# Patient Record
Sex: FEMALE | Race: WHITE | Employment: FULL TIME | URBAN - METROPOLITAN AREA
[De-identification: names, ages, dates, MRNs, and addresses within clinical notes are randomized per-mention and may not be internally consistent; named-entity substitution may affect disease eponyms.]

---

## 2021-07-07 LAB
ABO, EXTERNAL RESULT: NORMAL
HEP B, EXTERNAL RESULT: NEGATIVE
HIV, EXTERNAL RESULT: NON REACTIVE
RH FACTOR, EXTERNAL RESULT: POSITIVE
RPR, EXTERNAL RESULT: NON REACTIVE
RUBELLA TITER, EXTERNAL RESULT: NORMAL

## 2021-07-20 LAB
C. TRACHOMATIS, EXTERNAL RESULT: NEGATIVE
N. GONORRHOEAE, EXTERNAL RESULT: NEGATIVE

## 2021-10-22 ENCOUNTER — HOSPITAL ENCOUNTER (EMERGENCY)
Age: 21
Discharge: HOME | End: 2021-10-22
Attending: EMERGENCY MEDICINE
Payer: COMMERCIAL

## 2021-10-22 VITALS
HEART RATE: 77 BPM | TEMPERATURE: 97.9 F | SYSTOLIC BLOOD PRESSURE: 107 MMHG | HEIGHT: 61 IN | BODY MASS INDEX: 23.03 KG/M2 | WEIGHT: 122 LBS | DIASTOLIC BLOOD PRESSURE: 71 MMHG | RESPIRATION RATE: 18 BRPM | OXYGEN SATURATION: 100 %

## 2021-10-22 DIAGNOSIS — S39.012A STRAIN OF LUMBAR REGION, INITIAL ENCOUNTER: Primary | ICD-10-CM

## 2021-10-22 LAB
A/G RATIO: 1.1 (ref 1.1–2.2)
ALBUMIN SERPL-MCNC: 3.5 G/DL (ref 3.4–5)
ALP BLD-CCNC: 59 U/L (ref 40–129)
ALT SERPL-CCNC: 17 U/L (ref 10–40)
AMORPHOUS: ABNORMAL /HPF
ANION GAP SERPL CALCULATED.3IONS-SCNC: 12 MMOL/L (ref 3–16)
AST SERPL-CCNC: 21 U/L (ref 15–37)
BACTERIA: ABNORMAL /HPF
BASOPHILS ABSOLUTE: 0 K/UL (ref 0–0.2)
BASOPHILS RELATIVE PERCENT: 0.2 %
BILIRUB SERPL-MCNC: 0.5 MG/DL (ref 0–1)
BILIRUBIN URINE: NEGATIVE
BLOOD, URINE: ABNORMAL
BUN BLDV-MCNC: 8 MG/DL (ref 7–20)
CALCIUM SERPL-MCNC: 8.9 MG/DL (ref 8.3–10.6)
CHLORIDE BLD-SCNC: 100 MMOL/L (ref 99–110)
CLARITY: ABNORMAL
CO2: 22 MMOL/L (ref 21–32)
COLOR: YELLOW
CREAT SERPL-MCNC: 0.7 MG/DL (ref 0.6–1.1)
EOSINOPHILS ABSOLUTE: 0 K/UL (ref 0–0.6)
EOSINOPHILS RELATIVE PERCENT: 0.2 %
EPITHELIAL CELLS, UA: ABNORMAL /HPF (ref 0–5)
GFR AFRICAN AMERICAN: >60
GFR NON-AFRICAN AMERICAN: >60
GLOBULIN: 3.2 G/DL
GLUCOSE BLD-MCNC: 115 MG/DL (ref 70–99)
GLUCOSE URINE: NEGATIVE MG/DL
HCT VFR BLD CALC: 34 % (ref 36–48)
HEMOGLOBIN: 11.5 G/DL (ref 12–16)
KETONES, URINE: 40 MG/DL
LEUKOCYTE ESTERASE, URINE: NEGATIVE
LYMPHOCYTES ABSOLUTE: 1.2 K/UL (ref 1–5.1)
LYMPHOCYTES RELATIVE PERCENT: 7.7 %
MAGNESIUM: 1.7 MG/DL (ref 1.8–2.4)
MCH RBC QN AUTO: 31 PG (ref 26–34)
MCHC RBC AUTO-ENTMCNC: 33.8 G/DL (ref 31–36)
MCV RBC AUTO: 91.6 FL (ref 80–100)
MICROSCOPIC EXAMINATION: YES
MONOCYTES ABSOLUTE: 1.2 K/UL (ref 0–1.3)
MONOCYTES RELATIVE PERCENT: 7.8 %
NEUTROPHILS ABSOLUTE: 13 K/UL (ref 1.7–7.7)
NEUTROPHILS RELATIVE PERCENT: 84.1 %
NITRITE, URINE: NEGATIVE
PDW BLD-RTO: 14.1 % (ref 12.4–15.4)
PH UA: 7 (ref 5–8)
PLATELET # BLD: 281 K/UL (ref 135–450)
PMV BLD AUTO: 7.6 FL (ref 5–10.5)
POTASSIUM REFLEX MAGNESIUM: 3.4 MMOL/L (ref 3.5–5.1)
PROTEIN UA: NEGATIVE MG/DL
RBC # BLD: 3.71 M/UL (ref 4–5.2)
RBC UA: ABNORMAL /HPF (ref 0–4)
SODIUM BLD-SCNC: 134 MMOL/L (ref 136–145)
SPECIFIC GRAVITY UA: 1.02 (ref 1–1.03)
TOTAL PROTEIN: 6.7 G/DL (ref 6.4–8.2)
URINE REFLEX TO CULTURE: ABNORMAL
URINE TYPE: ABNORMAL
UROBILINOGEN, URINE: 0.2 E.U./DL
WBC # BLD: 15.4 K/UL (ref 4–11)
WBC UA: ABNORMAL /HPF (ref 0–5)

## 2021-10-22 PROCEDURE — 80053 COMPREHEN METABOLIC PANEL: CPT

## 2021-10-22 PROCEDURE — 6370000000 HC RX 637 (ALT 250 FOR IP): Performed by: PHYSICIAN ASSISTANT

## 2021-10-22 PROCEDURE — 99284 EMERGENCY DEPT VISIT MOD MDM: CPT

## 2021-10-22 PROCEDURE — 83735 ASSAY OF MAGNESIUM: CPT

## 2021-10-22 PROCEDURE — 81001 URINALYSIS AUTO W/SCOPE: CPT

## 2021-10-22 PROCEDURE — 85025 COMPLETE CBC W/AUTO DIFF WBC: CPT

## 2021-10-22 RX ORDER — ACETAMINOPHEN 500 MG
1000 TABLET ORAL ONCE
Status: COMPLETED | OUTPATIENT
Start: 2021-10-22 | End: 2021-10-22

## 2021-10-22 RX ADMIN — ACETAMINOPHEN 1000 MG: 500 TABLET ORAL at 20:59

## 2021-10-22 ASSESSMENT — PAIN SCALES - GENERAL
PAINLEVEL_OUTOF10: 4
PAINLEVEL_OUTOF10: 5
PAINLEVEL_OUTOF10: 4

## 2021-10-22 ASSESSMENT — PAIN DESCRIPTION - LOCATION: LOCATION: BACK

## 2021-10-22 ASSESSMENT — PAIN DESCRIPTION - PAIN TYPE: TYPE: ACUTE PAIN

## 2021-10-22 NOTE — ED PROVIDER NOTES
[facility]  ED  EMERGENCY DEPARTMENT ENCOUNTER        Pt Name: Carrie Miller  MRN: [id number]  Olivier 2000  Date of evaluation: 10/22/2021  Provider: Mandy Holman PA-C  PCP: Melonie Collier  Note Started: 7:02 PM EDT       MITA. I have evaluated this patient. My supervising physician was available for consultation. Catarina Hait COMPLAINT       Chief Complaint   Patient presents with    Back Pain     Right lower back pain since yesterday morning. Pt is 23 weeks pregnant. HISTORY OF PRESENT ILLNESS   (Location, Timing/Onset, Context/Setting, Quality, Duration, Modifying Factors, Severity, Associated Signs and Symptoms)  Note limiting factors. Chief Complaint: Right low back/flank pain    Nessa Aguilar is a 22 y.o. female who presents with the above complaint. Onset yesterday. 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 Believe this to be the cause. It is worse with sitting and moving. She indicates she has had some intermittent nausea and emesis. She indicates no diarrhea or constipation. No fevers or chills. No urinary complaints. [facility] 5/10/2021. The patient is pregnant. She is G1, P0 Ab0. She does see OB. Her next OB appointment is November 1, 2021. She does have a documented ultrasound single IUP. She indicates no vaginal bleeding or discharge at this time. The patient has had no medication specifically no Tylenol with the above complaint. She indicates no abdominal pain, uterine cramping. Nursing Notes were all reviewed and agreed with or any disagreements were addressed in the HPI. REVIEW OF SYSTEMS    (2-9 systems for level 4, 10 or more for level 5)     Review of Systems    Positives and Pertinent negatives as per HPI. Except as noted above in the ROS, all other systems were reviewed and negative. PAST MEDICAL HISTORY   History reviewed. No pertinent past medical history.       SURGICAL HISTORY     Past Surgical History:   Procedure Laterality Date    TONSILLECTOMY      TYMPANOSTOMY TUBE PLACEMENT           CURRENTMEDICATIONS       Previous Medications    NAPROXEN (NAPROSYN) 375 MG TABLET    Take 1 tablet by mouth 2 times daily for 7 days         ALLERGIES     Patient has no known allergies. FAMILYHISTORY     History reviewed. No pertinent family history. SOCIAL HISTORY       Social History     Tobacco Use    Smoking status: Passive Smoke Exposure - Never Smoker    Smokeless tobacco: Never Used   Substance Use Topics    Alcohol use: No    Drug use: No       SCREENINGS             PHYSICAL EXAM    (up to 7 for level 4, 8 or more for level 5)     ED Triage Vitals [10/22/21 1842]   BP Temp Temp Source Pulse Resp SpO2 Height Weight   104/75 97.9 °F (36.6 °C) Oral 99 18 100 % 5' 1\" (1.549 m) 122 lb (55.3 kg)       Physical Exam  Vitals and nursing note reviewed. Constitutional:       Appearance: Normal appearance. She is well-developed and normal weight. HENT:      Head: Normocephalic and atraumatic. Right Ear: External ear normal.      Left Ear: External ear normal.   Eyes:      General: No scleral icterus. Right eye: No discharge. Left eye: No discharge. Conjunctiva/sclera: Conjunctivae normal.   Cardiovascular:      Rate and Rhythm: Normal rate and regular rhythm. Heart sounds: Normal heart sounds. Pulmonary:      Effort: Pulmonary effort is normal.      Breath sounds: Normal breath sounds. Abdominal:      General: Abdomen is flat. Bowel sounds are normal.      Palpations: Abdomen is soft. Tenderness: There is no abdominal tenderness. Comments: Gravid uterus noted. Not tender. Musculoskeletal:         General: Tenderness present. Normal range of motion. Cervical back: Normal range of motion and neck supple. Comments: With pressure applied over the paralumbar structures on the right there is discomfort.    Skin:     General: Skin is warm and dry.   Neurological:      General: No focal deficit present. Mental Status: She is alert and oriented to person, place, and time. Mental status is at baseline. Psychiatric:         Mood and Affect: Mood normal.         Behavior: Behavior normal.         Thought Content:  Thought content normal.         Judgment: Judgment normal.         DIAGNOSTIC RESULTS   LABS:    Labs Reviewed   URINE RT REFLEX TO CULTURE - Abnormal; Notable for the following components:       Result Value    Clarity, UA CLOUDY (*)     Ketones, Urine 40 (*)     Blood, Urine TRACE-INTACT (*)     All other components within normal limits    Narrative:     Performed at:  [place] [address] [address]   Phone [phone]   CBC WITH AUTO DIFFERENTIAL - Abnormal; Notable for the following components:    WBC 15.4 (*)     RBC 3.71 (*)     Hemoglobin 11.5 (*)     Hematocrit 34.0 (*)     Neutrophils Absolute 13.0 (*)     All other components within normal limits    Narrative:     Performed at:  [place] [address] [address]   Phone [phone]   COMPREHENSIVE METABOLIC PANEL W/ REFLEX TO MG FOR LOW K - Abnormal; Notable for the following components:    Sodium 134 (*)     Potassium reflex Magnesium 3.4 (*)     Glucose 115 (*)     All other components within normal limits    Narrative:     Performed at:  [place] [address] [address]   Phone [phone]   MICROSCOPIC URINALYSIS - Abnormal; Notable for the following components:    Bacteria, UA 1+ (*)     All other components within normal limits    Narrative:     Performed at:  [place] [address] [address]   Phone [phone]   MAGNESIUM - Abnormal; Notable for the following components:    Magnesium 1.70 (*)     All other components within normal limits    Narrative: Performed at:  [facility]) [facility]  [address] [place] [address],  [place], [address] [place], [place] [address]s [place]   Phone [phone]       When ordered only abnormal lab results are displayed. All other labs were within normal range or not returned as of this dictation. EKG: When ordered, EKG's are interpreted by the Emergency Department Physician in the absence of a cardiologist.  Please see their note for interpretation of EKG. RADIOLOGY:   Non-plain film images such as CT, Ultrasound and MRI are read by the radiologist. Plain radiographic images are visualized and preliminarily interpreted by the ED Provider with the below findings:        Interpretation per the Radiologist below, if available at the time of this note:    No orders to display     No results found. PROCEDURES   Unless otherwise noted below, none     Procedures    CRITICAL CARE TIME   N/A    CONSULTS:  None      EMERGENCY DEPARTMENT COURSE and DIFFERENTIAL DIAGNOSIS/MDM:   Vitals:    Vitals:    10/22/21 1842 10/22/21 2100   BP: 104/75 107/71   Pulse: 99 77   Resp: 18 18   Temp: 97.9 °F (36.6 °C)    TempSrc: Oral    SpO2: 100% 100%   Weight: 122 lb (55.3 kg)    Height: 5' 1\" (1.549 m)        Patient was given the following medications:  Medications   acetaminophen (TYLENOL) tablet 1,000 mg (1,000 mg Oral Given 10/22/21 2059)           Patient presenting with a right lumbar strain. She has tenderness over the lumbar musculature. She has no uterine pain nor any vaginal bleeding or discharge. OB evaluation she is cleared. Patient is mechanical strain as she works in a  lifting children. UA shows no sign of UTI. WBC 15.4 and likely related to the pregnancy at 23 weeks. CMP shows normal renal and hepatic function. Magnesium slightly low 1.7. No replacement at this time. Potassium 3.4 no replacement at this time. Tylenol 1 g p.o. given. Recommend continuation Tylenol 1000 mg 3 times daily as as needed.   She does not use aspirin or

## 2021-10-22 NOTE — PROGRESS NOTES
23.4 wks with complaints of lower back pain (4/10) that she suspects may be from lifting at work. +FM and denies VB. Pt unsure who she sees for OB care. NST began at this time.

## 2021-10-23 NOTE — PROGRESS NOTES
Brief OB MD Note    Called by RN to evaluate NST for patient in ER with back pain at 23w4d    NST: 150 bpm, moderate variability, + accels, - decels  [place]: quiescent    Reassuring FHT  MD evaluation reported to Dr. Kanu Mccain via RN    Casey Bhandari MD

## 2021-10-23 NOTE — PROGRESS NOTES
(house doctor) reviewed NST.  (Pt's OB) updated on pt status and SBAR. No further orders from OB standpoint.

## 2021-10-26 NOTE — ED PROVIDER NOTES
Emergency Department Attending Provider Note  Location: [address] [place] [address]  ED  10/22/2021     Patient Identification  Nessa Albright is a 22 y.o. female      Iram Mclean was evaluated in the Emergency Department for back pain. Consistent with musculoskeletal pain. Labs were obtained which show a leukocytosis with no clinical evidence of infection. Likely secondary to current pregnancy. Plan for discharge with appropriate follow-up and return precautions. .  Initial history and physical exam information was obtained by MITA/NPP (who also dictated a record of this visit). I was available for consultation and discussion of diagnostic, treatment, and disposition decisions. However, I did not independently examine and evaluate this patient. This chart was generated in part by using Dragon Dictation system and may contain errors related to that system including errors in grammar, punctuation, and spelling, as well as words and phrases that may be inappropriate. If there are any questions or concerns please feel free to contact the dictating provider for clarification.      MD Quinn Vasquez MD  10/26/21 7104

## 2022-01-20 LAB — GBS, EXTERNAL RESULT: NEGATIVE

## 2022-02-14 ENCOUNTER — HOSPITAL ENCOUNTER (OUTPATIENT)
Age: 22
Discharge: HOME | End: 2022-02-14
Payer: COMMERCIAL

## 2022-02-14 ENCOUNTER — PREP FOR PROCEDURE (OUTPATIENT)
Dept: OBGYN | Age: 22
End: 2022-02-14

## 2022-02-14 DIAGNOSIS — Z34.93 NORMAL PREGNANCY, THIRD TRIMESTER: ICD-10-CM

## 2022-02-14 PROCEDURE — U0003 INFECTIOUS AGENT DETECTION BY NUCLEIC ACID (DNA OR RNA); SEVERE ACUTE RESPIRATORY SYNDROME CORONAVIRUS 2 (SARS-COV-2) (CORONAVIRUS DISEASE [COVID-19]), AMPLIFIED PROBE TECHNIQUE, MAKING USE OF HIGH THROUGHPUT TECHNOLOGIES AS DESCRIBED BY CMS-2020-01-R: HCPCS

## 2022-02-14 PROCEDURE — U0005 INFEC AGEN DETEC AMPLI PROBE: HCPCS

## 2022-02-14 RX ORDER — SODIUM CHLORIDE 0.9 % (FLUSH) 0.9 %
5-40 SYRINGE (ML) INJECTION PRN
Status: CANCELLED | OUTPATIENT
Start: 2022-02-14

## 2022-02-14 RX ORDER — NALBUPHINE HCL 10 MG/ML
10 AMPUL (ML) INJECTION
Status: CANCELLED | OUTPATIENT
Start: 2022-02-14

## 2022-02-14 RX ORDER — ONDANSETRON 2 MG/ML
4 INJECTION INTRAMUSCULAR; INTRAVENOUS EVERY 6 HOURS PRN
Status: CANCELLED | OUTPATIENT
Start: 2022-02-14

## 2022-02-14 RX ORDER — DOCUSATE SODIUM 100 MG/1
100 CAPSULE, LIQUID FILLED ORAL 2 TIMES DAILY
Status: CANCELLED | OUTPATIENT
Start: 2022-02-14

## 2022-02-14 RX ORDER — SODIUM CHLORIDE, SODIUM LACTATE, POTASSIUM CHLORIDE, CALCIUM CHLORIDE 600; 310; 30; 20 MG/100ML; MG/100ML; MG/100ML; MG/100ML
INJECTION, SOLUTION INTRAVENOUS CONTINUOUS
Status: CANCELLED | OUTPATIENT
Start: 2022-02-14

## 2022-02-14 RX ORDER — SODIUM CHLORIDE 9 MG/ML
25 INJECTION, SOLUTION INTRAVENOUS PRN
Status: CANCELLED | OUTPATIENT
Start: 2022-02-14

## 2022-02-14 RX ORDER — BUTORPHANOL TARTRATE 1 MG/ML
1 INJECTION, SOLUTION INTRAMUSCULAR; INTRAVENOUS
Status: CANCELLED | OUTPATIENT
Start: 2022-02-14

## 2022-02-14 RX ORDER — SODIUM CHLORIDE, SODIUM LACTATE, POTASSIUM CHLORIDE, AND CALCIUM CHLORIDE .6; .31; .03; .02 G/100ML; G/100ML; G/100ML; G/100ML
1000 INJECTION, SOLUTION INTRAVENOUS PRN
Status: CANCELLED | OUTPATIENT
Start: 2022-02-14

## 2022-02-14 RX ORDER — SODIUM CHLORIDE, SODIUM LACTATE, POTASSIUM CHLORIDE, AND CALCIUM CHLORIDE .6; .31; .03; .02 G/100ML; G/100ML; G/100ML; G/100ML
500 INJECTION, SOLUTION INTRAVENOUS PRN
Status: CANCELLED | OUTPATIENT
Start: 2022-02-14

## 2022-02-14 RX ORDER — SODIUM CHLORIDE 0.9 % (FLUSH) 0.9 %
5-40 SYRINGE (ML) INJECTION EVERY 12 HOURS SCHEDULED
Status: CANCELLED | OUTPATIENT
Start: 2022-02-14

## 2022-02-14 NOTE — PROGRESS NOTES
>      PATIENT:  Cordelia Vasquez  YOB: 2000  AGE:  22 years  DATE:   02/14/2022 11:45 AM   VISIT TYPE: OB Prenatal      First Visit Detail: Visit date: 06/23/2021  First Trimester  First visit here     GESTATIONAL AGE: 40w0d    JOANN CONFIRMATION  INITIAL JOANN: DATE                         EGA                                     JOANN   LMP 05/10/2021 40w0d     02/14/2022   ULTRASOUND 07/12/2021 9w4d     02/10/2022       WORKING JOANN: 02/14/2022  last modified on 02/14/2022 by Onesimo Muir. MULTIPLE GESTATION:  [place]  MENSTRUAL HISTORY:                  LMP:  definite                    Menses monthly:  yes                    Menarche:  14 y.o. (onset)                   On BCP at concept:  yes                    Other contraception:  none                         History of Present Illness:  1.  routine prenatal         Weight Status  Pre-pregnancy weight (lb): 113.60 Date: 06/23/2021. Screening Tools  Other Screenings:  Encounter Date Performed Date Instrument Score Severity/Interpretation MDD Classification   02/14/2022 02/14/2022 CAGE-AID Alcohol and Drug Screening 0 None    02/14/2022 02/14/2022 Patient Health Questionnaire (PHQ-2) 0 Further testing is not required    02/14/2022 02/14/2022 SDOH 0 Intervention not needed      CAGE ALCOHOL SCREENING TEST      Felt need to cut down drinking? No       Ever felt annoyed by criticism of drinking? No       Had guilty feelings about drinking? No       Ever take morning eye-opener?  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 N       MEDICAL HISTORY        PPD/Amt / Day PPD/Amt/ Day # Years Use    Pre-preg Preg      Tobacco   0.00     Alcohol        Illicit/recreational drugs             GENERAL      Patient Agrees to Transfusion: Yes      Antepartum Anesthesia Consult Planned: Yes      Patient Desires Sterilization: No Planned Feeding: Breast      Enrolled in [facility] Prenatal Care Program: No      Prenatal Classes Taken: Yes      Seat Belt Use: Yes      Cats in Home: Yes    Medications (active prior to today)  Medication Name Sig Description Start Date Stop Date Refilled Rx Elsewhere   PRENATAL VITAMINS  //   Y     Medication Reconciliation  Medications reconciled today. Medication Reviewed  Adherence Medication Name Sig Desc Elsewhere Status   taking as directed PRENATAL VITAMINS  Y Verified   ALLERGIES  Description Reaction (Severity)   NO KNOWN ALLERGIES    Reviewed, no changes. Gynecologic History:  Patient is premenopausal. Last menses was 05/10/2021. Menarche:  14 y.o. (onset)  Patient has not had a hysterectomy. OBSTETRIC HISTORY    Currently pregnant. :1.      Parity: Term:0.  Pre-Term: 0. : 0. Livin. Past Systemic History    Medical History (Detailed)    Surgical History/Management (Detailed)  Diagnostics History:  Status Study Ordered Completed Interpretation  Result / Report   completed OB Anatomy >/=14 WKS, SINGLE FETUS 2021      completed ULTRASOUND/GROWTH <14 WKS 10/12/2021 2021      completed ULTRASOUND/PREG BRIEF 2021          Pap Result, HPV Detail and Diagnostic/Treatment Performed        Family History  (Detailed)  Relationship Family Member Name  Age at Death Condition Onset Age Cause of Death   Maternal grandfather    Cancer, lung  Y   Paternal grandfather    Cancer, lung  Y   Paternal uncle    Depression  N   Paternal uncle    Alcoholism  N   SOCIAL HISTORY:  (Detailed)  GENERAL INFORMATION   Preferred language is English. Language spoken at home is [place]. Born in [facility]. RACE  White  Patient's ethnicity is Non  or . Occupational hazards include none. The patient has a(n) high school education.     Employment History Status Retired Restrictions          TOBACCO  Smoking status: Never smoker. SMOKING STATUS  Type Smoking Status Usage Per Day Years Used Pack Years Total Pack Years    Never smoker             VAPING USE  Screened for vaping? Yes  Status: Not a current user    ALCOHOL  There is no history of alcohol use. CAFFEINE  The patient uses caffeine: soda - 24 oz a day  LIFESTYLE  Pets include cats. HOME ENVIROMENT/SAFETY  Smoke detector(s) at home. No firearms at home. Uses seat belts. ADDITIONAL SOCIAL HISTORY   EDUCATION/EMPLOYMENT/OCCUPATION/ EXPERIENCE  Attends [employer] . Grade level in school is eleventh grade. Patient has repeated grade(s). MARITAL STATUS/FAMILY/SOCIAL SUPPORT  Marital status: Single   ADDITIONAL SOCIAL HISTORY   EDUCATION/EMPLOYMENT/OCCUPATION/ EXPERIENCE  Attends [employer] . Grade level in school is eleventh grade. Patient has repeated grade(s). MARITAL STATUS/FAMILY/SOCIAL SUPPORT  Marital status: Single   PRENATAL FLOWSHEET  General  Height(in.): 61.0 inches  Pre-pregnancy weight:   113.60. Date: 06/23/2021. Last weight: 138. 20. Date: 02/14/2022. Total weight gain:  24.60. [de-identified] father: Kirti Shankar  Support person: Rae April (mom)  Plan is to breast feed. Patient is not enrolled in [facility] prenatal care program.    Patient agrees to transfusion. Antepartum anesthesia consult planned. Patient will attend prenatal classes. Sterilization is not desired. Cats in home    PROBLEM DETAIL  Priority Problem Detail    H/O HSV-1 vs Canker sore? 11/2020 Pt denied H/O of HSV. . On 11/10/2020 pt. saw PCP for a canker sore and was dx with HSV, but unable to find any cultures or blood work to confirm, pt states she has never had HSV, 7/7/21 MLS. conceived on OC's Patient missed pills in the pack when conception occurred 7/7/2021 MLS.     Mild bilateral pyelectasis - resoloved 11/1/21 11/1 @25.5: 794g/53%, Fr BREECH, R 3.9/L 3.1 normal, mvp nl. JL  9/27 at 20.0w, S=D, mild shari pyelectasis R 3.9mm/L 4.7mm, overall Result details: Color: light yellow. Clarity: clear. Glucose: negative. Bilirubin: negative. Ketones: negative. Specific Gravity: 1.010. Blood: trace. pH: 6.0. Protein: negative. Urobilinogen: normal.  Nitrite: negative. Leukocytes: negative. MEDICATIONS (added, continued, or stopped today)  Start Date Medication Directions PRN Status PRN Reason Instruction Stop Date    PRENATAL VITAMINS  N        To Be Scheduled / Ordered:  Status Order Reason Assessment Timeframe Appointment   ordered FETAL NONSTRESS TEST/IN OFFICE  O09.93     ORDERS/PROCEDURES/INSTRUCTIONS/EDUCATION  OFFICE LABS:  Order     FETAL NONSTRESS TEST/IN OFFICE     Urine Dip Only see detail Color: light yellow. Clarity: clear. Glucose: negative. Bilirubin: negative. Ketones: negative. Specific Gravity: 1.010. Blood: trace. pH: 6.0. Protein: negative. Urobilinogen: normal.  Nitrite: negative. Leukocytes: negative. The patient was checked out at 12:23 PM by Gene Calloway. Patient Comments    Active Patient Care Team Members    Name Contact Agency Type Support Role Relationship Active Date Inactive Date Specialty   Orson Mcburney    Parent, Child Is The PT      Kade Xavier DO   Patient provider PCP   Family Pract       Provider:   Jordan Armando  02/14/2022 6:49 PM   Document generated by:  Hanna Haynes 02/14/2022 06:49 PM    [facility]  [address] [place] [address]   Phone: [phone]  Fax: [phone]          Electronically signed by Hanna Haynes DO on 02/14/2022 06:49 PM

## 2022-02-14 NOTE — H&P
Katelynn Ro is a 22 y.o. female patient. No diagnosis found. No past medical history on file. OB History        1    Para        Term                AB        Living           SAB        IAB        Ectopic        Molar        Multiple        Live Births                  40w0d  Estimated Date of Delivery: 22  No Known Allergies  Active Problems:    * No active hospital problems. *  Resolved Problems:    * No resolved hospital problems. *    There were no vitals taken for this visit.     History  Exam  Assessment & DO Jovana  2022

## 2022-02-15 ENCOUNTER — APPOINTMENT (OUTPATIENT)
Dept: LABOR AND DELIVERY | Age: 22
End: 2022-02-15
Payer: COMMERCIAL

## 2022-02-15 ENCOUNTER — ANESTHESIA (OUTPATIENT)
Dept: LABOR AND DELIVERY | Age: 22
End: 2022-02-15
Payer: COMMERCIAL

## 2022-02-15 ENCOUNTER — ANESTHESIA EVENT (OUTPATIENT)
Dept: LABOR AND DELIVERY | Age: 22
End: 2022-02-15
Payer: COMMERCIAL

## 2022-02-15 ENCOUNTER — HOSPITAL ENCOUNTER (INPATIENT)
Age: 22
LOS: 3 days | Discharge: HOME | End: 2022-02-18
Attending: OBSTETRICS & GYNECOLOGY | Admitting: OBSTETRICS & GYNECOLOGY
Payer: COMMERCIAL

## 2022-02-15 LAB
ABO/RH: NORMAL
AMPHETAMINE SCREEN, URINE: NORMAL
ANTIBODY SCREEN: NORMAL
BARBITURATE SCREEN URINE: NORMAL
BASOPHILS ABSOLUTE: 0 K/UL (ref 0–0.2)
BASOPHILS RELATIVE PERCENT: 0.4 %
BENZODIAZEPINE SCREEN, URINE: NORMAL
BUPRENORPHINE URINE: NORMAL
CANNABINOID SCREEN URINE: NORMAL
COCAINE METABOLITE SCREEN URINE: NORMAL
EOSINOPHILS ABSOLUTE: 0 K/UL (ref 0–0.6)
EOSINOPHILS RELATIVE PERCENT: 0.3 %
HCT VFR BLD CALC: 37.3 % (ref 36–48)
HEMOGLOBIN: 12.4 G/DL (ref 12–16)
LYMPHOCYTES ABSOLUTE: 1.6 K/UL (ref 1–5.1)
LYMPHOCYTES RELATIVE PERCENT: 15.1 %
Lab: NORMAL
MCH RBC QN AUTO: 28.5 PG (ref 26–34)
MCHC RBC AUTO-ENTMCNC: 33.2 G/DL (ref 31–36)
MCV RBC AUTO: 85.7 FL (ref 80–100)
METHADONE SCREEN, URINE: NORMAL
MONOCYTES ABSOLUTE: 0.7 K/UL (ref 0–1.3)
MONOCYTES RELATIVE PERCENT: 6.8 %
NEUTROPHILS ABSOLUTE: 8.3 K/UL (ref 1.7–7.7)
NEUTROPHILS RELATIVE PERCENT: 77.4 %
OPIATE SCREEN URINE: NORMAL
OXYCODONE URINE: NORMAL
PDW BLD-RTO: 13.9 % (ref 12.4–15.4)
PH UA: 6
PHENCYCLIDINE SCREEN URINE: NORMAL
PLATELET # BLD: 235 K/UL (ref 135–450)
PMV BLD AUTO: 9.1 FL (ref 5–10.5)
PROPOXYPHENE SCREEN: NORMAL
RBC # BLD: 4.36 M/UL (ref 4–5.2)
SARS-COV-2: NOT DETECTED
WBC # BLD: 10.8 K/UL (ref 4–11)

## 2022-02-15 PROCEDURE — 51701 INSERT BLADDER CATHETER: CPT

## 2022-02-15 PROCEDURE — 86850 RBC ANTIBODY SCREEN: CPT

## 2022-02-15 PROCEDURE — 1220000000 HC SEMI PRIVATE OB R&B

## 2022-02-15 PROCEDURE — 2500000003 HC RX 250 WO HCPCS: Performed by: NURSE ANESTHETIST, CERTIFIED REGISTERED

## 2022-02-15 PROCEDURE — 2580000003 HC RX 258: Performed by: OBSTETRICS & GYNECOLOGY

## 2022-02-15 PROCEDURE — 86900 BLOOD TYPING SEROLOGIC ABO: CPT

## 2022-02-15 PROCEDURE — 86901 BLOOD TYPING SEROLOGIC RH(D): CPT

## 2022-02-15 PROCEDURE — 80307 DRUG TEST PRSMV CHEM ANLYZR: CPT

## 2022-02-15 PROCEDURE — 85025 COMPLETE CBC W/AUTO DIFF WBC: CPT

## 2022-02-15 PROCEDURE — 86592 SYPHILIS TEST NON-TREP QUAL: CPT

## 2022-02-15 PROCEDURE — 3700000025 EPIDURAL BLOCK: Performed by: ANESTHESIOLOGY

## 2022-02-15 RX ORDER — SODIUM CHLORIDE 0.9 % (FLUSH) 0.9 %
5-40 SYRINGE (ML) INJECTION EVERY 12 HOURS SCHEDULED
Status: DISCONTINUED | OUTPATIENT
Start: 2022-02-15 | End: 2022-02-16

## 2022-02-15 RX ORDER — BUTORPHANOL TARTRATE 1 MG/ML
1 INJECTION, SOLUTION INTRAMUSCULAR; INTRAVENOUS
Status: DISCONTINUED | OUTPATIENT
Start: 2022-02-15 | End: 2022-02-16

## 2022-02-15 RX ORDER — BUPIVACAINE HYDROCHLORIDE 2.5 MG/ML
INJECTION, SOLUTION EPIDURAL; INFILTRATION; INTRACAUDAL PRN
Status: DISCONTINUED | OUTPATIENT
Start: 2022-02-15 | End: 2022-02-16 | Stop reason: SDUPTHER

## 2022-02-15 RX ORDER — SODIUM CHLORIDE, SODIUM LACTATE, POTASSIUM CHLORIDE, AND CALCIUM CHLORIDE .6; .31; .03; .02 G/100ML; G/100ML; G/100ML; G/100ML
1000 INJECTION, SOLUTION INTRAVENOUS PRN
Status: DISCONTINUED | OUTPATIENT
Start: 2022-02-15 | End: 2022-02-16

## 2022-02-15 RX ORDER — SODIUM CHLORIDE, SODIUM LACTATE, POTASSIUM CHLORIDE, CALCIUM CHLORIDE 600; 310; 30; 20 MG/100ML; MG/100ML; MG/100ML; MG/100ML
INJECTION, SOLUTION INTRAVENOUS CONTINUOUS
Status: DISCONTINUED | OUTPATIENT
Start: 2022-02-15 | End: 2022-02-16

## 2022-02-15 RX ORDER — DOCUSATE SODIUM 100 MG/1
100 CAPSULE, LIQUID FILLED ORAL 2 TIMES DAILY
Status: DISCONTINUED | OUTPATIENT
Start: 2022-02-15 | End: 2022-02-16

## 2022-02-15 RX ORDER — SODIUM CHLORIDE 9 MG/ML
25 INJECTION, SOLUTION INTRAVENOUS PRN
Status: DISCONTINUED | OUTPATIENT
Start: 2022-02-15 | End: 2022-02-16

## 2022-02-15 RX ORDER — NALBUPHINE HCL 10 MG/ML
10 AMPUL (ML) INJECTION
Status: DISCONTINUED | OUTPATIENT
Start: 2022-02-15 | End: 2022-02-16

## 2022-02-15 RX ORDER — SODIUM CHLORIDE, SODIUM LACTATE, POTASSIUM CHLORIDE, AND CALCIUM CHLORIDE .6; .31; .03; .02 G/100ML; G/100ML; G/100ML; G/100ML
500 INJECTION, SOLUTION INTRAVENOUS PRN
Status: DISCONTINUED | OUTPATIENT
Start: 2022-02-15 | End: 2022-02-16

## 2022-02-15 RX ORDER — ONDANSETRON 2 MG/ML
4 INJECTION INTRAMUSCULAR; INTRAVENOUS EVERY 6 HOURS PRN
Status: DISCONTINUED | OUTPATIENT
Start: 2022-02-15 | End: 2022-02-16

## 2022-02-15 RX ORDER — SODIUM CHLORIDE 0.9 % (FLUSH) 0.9 %
5-40 SYRINGE (ML) INJECTION PRN
Status: DISCONTINUED | OUTPATIENT
Start: 2022-02-15 | End: 2022-02-16

## 2022-02-15 RX ADMIN — Medication 15 ML/HR: at 21:06

## 2022-02-15 RX ADMIN — SODIUM CHLORIDE, POTASSIUM CHLORIDE, SODIUM LACTATE AND CALCIUM CHLORIDE: 600; 310; 30; 20 INJECTION, SOLUTION INTRAVENOUS at 20:23

## 2022-02-15 RX ADMIN — SODIUM CHLORIDE, POTASSIUM CHLORIDE, SODIUM LACTATE AND CALCIUM CHLORIDE: 600; 310; 30; 20 INJECTION, SOLUTION INTRAVENOUS at 21:03

## 2022-02-15 RX ADMIN — BUPIVACAINE HYDROCHLORIDE 7 ML: 2.5 INJECTION, SOLUTION EPIDURAL; INFILTRATION; INTRACAUDAL; PERINEURAL at 20:59

## 2022-02-15 ASSESSMENT — PAIN DESCRIPTION - DESCRIPTORS
DESCRIPTORS: CRAMPING
DESCRIPTORS: PRESSURE;SHARP
DESCRIPTORS: PRESSURE;SHARP
DESCRIPTORS: CRAMPING
DESCRIPTORS: CRAMPING

## 2022-02-15 NOTE — PROGRESS NOTES
Pt verbalized understanding of verbal and written discharge instructions and denies having questions at this time. Pt left OB unit at 1820 ambulatory, undelivered, and in stable condition, accompanied by FOB. Patient is not in active labor.

## 2022-02-15 NOTE — PROGRESS NOTES
Pt arrived to triage with c/o contractions that started at around 0230 this morning and have progressively gotten worse. Pt denies vaginal bleeding and gushes of fluid. Baby has been active.

## 2022-02-16 LAB
HCT VFR BLD CALC: 31.4 % (ref 36–48)
HEMOGLOBIN: 10.5 G/DL (ref 12–16)
MCH RBC QN AUTO: 28.6 PG (ref 26–34)
MCHC RBC AUTO-ENTMCNC: 33.5 G/DL (ref 31–36)
MCV RBC AUTO: 85.6 FL (ref 80–100)
PDW BLD-RTO: 14.3 % (ref 12.4–15.4)
PLATELET # BLD: 199 K/UL (ref 135–450)
PMV BLD AUTO: 8.7 FL (ref 5–10.5)
RBC # BLD: 3.67 M/UL (ref 4–5.2)
RPR: NORMAL
WBC # BLD: 17.7 K/UL (ref 4–11)

## 2022-02-16 PROCEDURE — 0UC97ZZ EXTIRPATION OF MATTER FROM UTERUS, VIA NATURAL OR ARTIFICIAL OPENING: ICD-10-PCS | Performed by: OBSTETRICS & GYNECOLOGY

## 2022-02-16 PROCEDURE — 36415 COLL VENOUS BLD VENIPUNCTURE: CPT

## 2022-02-16 PROCEDURE — 6370000000 HC RX 637 (ALT 250 FOR IP): Performed by: OBSTETRICS & GYNECOLOGY

## 2022-02-16 PROCEDURE — 7200000001 HC VAGINAL DELIVERY

## 2022-02-16 PROCEDURE — 10907ZC DRAINAGE OF AMNIOTIC FLUID, THERAPEUTIC FROM PRODUCTS OF CONCEPTION, VIA NATURAL OR ARTIFICIAL OPENING: ICD-10-PCS | Performed by: OBSTETRICS & GYNECOLOGY

## 2022-02-16 PROCEDURE — 0UQMXZZ REPAIR VULVA, EXTERNAL APPROACH: ICD-10-PCS | Performed by: OBSTETRICS & GYNECOLOGY

## 2022-02-16 PROCEDURE — 6360000002 HC RX W HCPCS: Performed by: OBSTETRICS & GYNECOLOGY

## 2022-02-16 PROCEDURE — 6360000002 HC RX W HCPCS

## 2022-02-16 PROCEDURE — 51701 INSERT BLADDER CATHETER: CPT

## 2022-02-16 PROCEDURE — 85027 COMPLETE CBC AUTOMATED: CPT

## 2022-02-16 PROCEDURE — 0DQR0ZZ REPAIR ANAL SPHINCTER, OPEN APPROACH: ICD-10-PCS | Performed by: OBSTETRICS & GYNECOLOGY

## 2022-02-16 PROCEDURE — 2580000003 HC RX 258: Performed by: OBSTETRICS & GYNECOLOGY

## 2022-02-16 PROCEDURE — 1220000000 HC SEMI PRIVATE OB R&B

## 2022-02-16 RX ORDER — ONDANSETRON 2 MG/ML
4 INJECTION INTRAMUSCULAR; INTRAVENOUS EVERY 6 HOURS PRN
Status: DISCONTINUED | OUTPATIENT
Start: 2022-02-16 | End: 2022-02-18 | Stop reason: HOSPADM

## 2022-02-16 RX ORDER — HYDROCODONE BITARTRATE AND ACETAMINOPHEN 5; 325 MG/1; MG/1
2 TABLET ORAL EVERY 4 HOURS PRN
Status: DISCONTINUED | OUTPATIENT
Start: 2022-02-16 | End: 2022-02-18 | Stop reason: HOSPADM

## 2022-02-16 RX ORDER — SODIUM CHLORIDE 9 MG/ML
25 INJECTION, SOLUTION INTRAVENOUS PRN
Status: DISCONTINUED | OUTPATIENT
Start: 2022-02-16 | End: 2022-02-18 | Stop reason: HOSPADM

## 2022-02-16 RX ORDER — METHYLERGONOVINE MALEATE 0.2 MG/ML
INJECTION INTRAVENOUS
Status: COMPLETED
Start: 2022-02-16 | End: 2022-02-16

## 2022-02-16 RX ORDER — CARBOPROST TROMETHAMINE 250 UG/ML
250 INJECTION, SOLUTION INTRAMUSCULAR PRN
Status: DISCONTINUED | OUTPATIENT
Start: 2022-02-16 | End: 2022-02-18 | Stop reason: HOSPADM

## 2022-02-16 RX ORDER — SODIUM CHLORIDE, SODIUM LACTATE, POTASSIUM CHLORIDE, CALCIUM CHLORIDE 600; 310; 30; 20 MG/100ML; MG/100ML; MG/100ML; MG/100ML
INJECTION, SOLUTION INTRAVENOUS CONTINUOUS
Status: DISCONTINUED | OUTPATIENT
Start: 2022-02-16 | End: 2022-02-18 | Stop reason: HOSPADM

## 2022-02-16 RX ORDER — SODIUM CHLORIDE 0.9 % (FLUSH) 0.9 %
5-40 SYRINGE (ML) INJECTION EVERY 12 HOURS SCHEDULED
Status: DISCONTINUED | OUTPATIENT
Start: 2022-02-16 | End: 2022-02-18 | Stop reason: HOSPADM

## 2022-02-16 RX ORDER — SODIUM CHLORIDE 0.9 % (FLUSH) 0.9 %
5-40 SYRINGE (ML) INJECTION PRN
Status: DISCONTINUED | OUTPATIENT
Start: 2022-02-16 | End: 2022-02-18 | Stop reason: HOSPADM

## 2022-02-16 RX ORDER — DOCUSATE SODIUM 100 MG/1
100 CAPSULE, LIQUID FILLED ORAL 2 TIMES DAILY PRN
Status: DISCONTINUED | OUTPATIENT
Start: 2022-02-16 | End: 2022-02-18 | Stop reason: HOSPADM

## 2022-02-16 RX ORDER — LANOLIN 100 %
OINTMENT (GRAM) TOPICAL PRN
Status: DISCONTINUED | OUTPATIENT
Start: 2022-02-16 | End: 2022-02-18 | Stop reason: HOSPADM

## 2022-02-16 RX ORDER — ACETAMINOPHEN 325 MG/1
650 TABLET ORAL EVERY 4 HOURS PRN
Status: DISCONTINUED | OUTPATIENT
Start: 2022-02-16 | End: 2022-02-18 | Stop reason: HOSPADM

## 2022-02-16 RX ORDER — IBUPROFEN 800 MG/1
800 TABLET ORAL EVERY 6 HOURS PRN
Status: DISCONTINUED | OUTPATIENT
Start: 2022-02-16 | End: 2022-02-18 | Stop reason: HOSPADM

## 2022-02-16 RX ORDER — KETOROLAC TROMETHAMINE 30 MG/ML
30 INJECTION, SOLUTION INTRAMUSCULAR; INTRAVENOUS EVERY 6 HOURS PRN
Status: DISPENSED | OUTPATIENT
Start: 2022-02-16 | End: 2022-02-18

## 2022-02-16 RX ORDER — HYDROCODONE BITARTRATE AND ACETAMINOPHEN 5; 325 MG/1; MG/1
1 TABLET ORAL EVERY 4 HOURS PRN
Status: DISCONTINUED | OUTPATIENT
Start: 2022-02-16 | End: 2022-02-18 | Stop reason: HOSPADM

## 2022-02-16 RX ADMIN — ONDANSETRON 4 MG: 2 INJECTION INTRAMUSCULAR; INTRAVENOUS at 17:43

## 2022-02-16 RX ADMIN — HYDROCODONE BITARTRATE AND ACETAMINOPHEN 2 TABLET: 5; 325 TABLET ORAL at 10:31

## 2022-02-16 RX ADMIN — HYDROCORTISONE: 25 CREAM TOPICAL at 20:08

## 2022-02-16 RX ADMIN — METHYLERGONOVINE MALEATE 200 MCG: 0.2 INJECTION, SOLUTION INTRAMUSCULAR; INTRAVENOUS at 03:50

## 2022-02-16 RX ADMIN — DOCUSATE SODIUM 100 MG: 100 CAPSULE, LIQUID FILLED ORAL at 20:09

## 2022-02-16 RX ADMIN — HYDROCODONE BITARTRATE AND ACETAMINOPHEN 2 TABLET: 5; 325 TABLET ORAL at 19:25

## 2022-02-16 RX ADMIN — ACETAMINOPHEN 325 MG: 325 TABLET ORAL at 15:23

## 2022-02-16 RX ADMIN — MOXIFLOXACIN HYDROCHLORIDE 800 MG: 400 TABLET, FILM COATED ORAL at 10:40

## 2022-02-16 RX ADMIN — MOXIFLOXACIN HYDROCHLORIDE 800 MG: 400 TABLET, FILM COATED ORAL at 17:43

## 2022-02-16 RX ADMIN — MOXIFLOXACIN HYDROCHLORIDE 800 MG: 400 TABLET, FILM COATED ORAL at 23:20

## 2022-02-16 RX ADMIN — Medication 40 EACH: at 20:08

## 2022-02-16 RX ADMIN — KETOROLAC TROMETHAMINE 30 MG: 30 INJECTION, SOLUTION INTRAMUSCULAR at 05:15

## 2022-02-16 RX ADMIN — SODIUM CHLORIDE, POTASSIUM CHLORIDE, SODIUM LACTATE AND CALCIUM CHLORIDE: 600; 310; 30; 20 INJECTION, SOLUTION INTRAVENOUS at 00:18

## 2022-02-16 RX ADMIN — HYDROCODONE BITARTRATE AND ACETAMINOPHEN 1 TABLET: 5; 325 TABLET ORAL at 23:20

## 2022-02-16 RX ADMIN — Medication 909.1 MILLI-UNITS/MIN: at 03:01

## 2022-02-16 RX ADMIN — DOCUSATE SODIUM 100 MG: 100 CAPSULE, LIQUID FILLED ORAL at 10:31

## 2022-02-16 RX ADMIN — Medication 40 EACH: at 10:31

## 2022-02-16 RX ADMIN — Medication 10 ML: at 20:21

## 2022-02-16 ASSESSMENT — PAIN SCALES - GENERAL
PAINLEVEL_OUTOF10: 6
PAINLEVEL_OUTOF10: 4
PAINLEVEL_OUTOF10: 8
PAINLEVEL_OUTOF10: 7
PAINLEVEL_OUTOF10: 4
PAINLEVEL_OUTOF10: 0

## 2022-02-16 NOTE — PLAN OF CARE
Problem: Discharge Planning:  Goal: Discharged to appropriate level of care  Description: Discharged to appropriate level of care  Outcome: Ongoing     Problem: Constipation:  Goal: Bowel elimination is within specified parameters  Description: Bowel elimination is within specified parameters  Outcome: Ongoing     Problem: Fluid Volume - Imbalance:  Goal: Absence of imbalanced fluid volume signs and symptoms  Description: Absence of imbalanced fluid volume signs and symptoms  Outcome: Ongoing  Goal: Absence of postpartum hemorrhage signs and symptoms  Description: Absence of postpartum hemorrhage signs and symptoms  Outcome: Ongoing     Problem: Infection - Risk of, Puerperal Infection:  Goal: Will show no infection signs and symptoms  Description: Will show no infection signs and symptoms  Outcome: Ongoing     Problem: Mood - Altered:  Goal: Mood stable  Description: Mood stable  Outcome: Ongoing     Problem: Pain - Acute:  Goal: Pain level will decrease  Description: Pain level will decrease  Outcome: Ongoing     Problem: Pain:  Goal: Pain level will decrease  Description: Pain level will decrease  Outcome: Completed  Goal: Control of acute pain  Description: Control of acute pain  Outcome: Completed  Goal: Control of chronic pain  Description: Control of chronic pain  Outcome: Completed     Problem: Anxiety:  Goal: Level of anxiety will decrease  Description: Level of anxiety will decrease  Outcome: Completed     Problem: Breathing Pattern - Ineffective:  Goal: Able to breathe comfortably  Description: Able to breathe comfortably  Outcome: Completed     Problem: Fluid Volume - Imbalance:  Goal: Absence of imbalanced fluid volume signs and symptoms  Description: Absence of imbalanced fluid volume signs and symptoms  Outcome: Completed  Goal: Absence of intrapartum hemorrhage signs and symptoms  Description: Absence of intrapartum hemorrhage signs and symptoms  Outcome: Completed     Problem: Infection - Intrapartum Infection:  Goal: Will show no infection signs and symptoms  Description: Will show no infection signs and symptoms  Outcome: Completed     Problem: Labor Process - Prolonged:  Goal: Labor progression, first stage, within specified pattern  Description: Labor progression, first stage, within specified pattern  Outcome: Completed  Goal: Labor progession, second stage, within specified pattern  Description: Labor progession, second stage, within specified pattern  Outcome: Completed  Goal: Uterine contractions within specified parameters  Description: Uterine contractions within specified parameters  Outcome: Completed     Problem:  Screening:  Goal: Ability to make informed decisions regarding treatment has improved  Description: Ability to make informed decisions regarding treatment has improved  Outcome: Completed     Problem: Pain - Acute:  Goal: Pain level will decrease  Description: Pain level will decrease  Outcome: Completed  Goal: Able to cope with pain  Description: Able to cope with pain  Outcome: Completed     Problem: Tissue Perfusion - Uteroplacental, Altered:  Goal: Absence of abnormal fetal heart rate pattern  Description: Absence of abnormal fetal heart rate pattern  Outcome: Completed     Problem: Urinary Retention:  Goal: Experiences of bladder distention will decrease  Description: Experiences of bladder distention will decrease  Outcome: Completed  Goal: Urinary elimination within specified parameters  Description: Urinary elimination within specified parameters  Outcome: Completed

## 2022-02-16 NOTE — PROGRESS NOTES
Dr. Mitesh Villatoro updated patient is complete and at +1 station. Patient reports leakage of fluid, RN noticed small amount of clear fluid and bloody show in bed but BBOW felt during SVE. Patient is laboring down at this time. Provider en route to birthing center at this time.

## 2022-02-16 NOTE — H&P
Department of Obstetrics and Gynecology  Labor and Delivery Admit Note        CHIEF COMPLAINT:  contractions    HISTORY OF PRESENT ILLNESS:      The patient is a 22 y.o. female 41w4d. OB History        1    Para        Term                AB        Living           SAB        IAB        Ectopic        Molar        Multiple        Live Births                  Patient presented to L&D triage c/o ctx and was 1cm dilated. She continued to progress without intervention and is now completely dilated. Possible lof, no vb, +fm. Estimated Due Date:  Estimated Date of Delivery: 22    PAST MEDICAL HISTORY:   Past Medical History:   Diagnosis Date    HSV (herpes simplex virus) anogenital infection     pt stated \"canker sore\" no treatment       PAST  SURGICAL HISTORY:   Past Surgical History:   Procedure Laterality Date    TONSILLECTOMY      TYMPANOSTOMY TUBE PLACEMENT         SOCIAL HISTORY:     reports that she is a non-smoker but has been exposed to tobacco smoke. She has never used smokeless tobacco. She reports that she does not drink alcohol and does not use drugs. MEDICATIONS:    Prior to Admission medications    Medication Sig Start Date End Date Taking? Authorizing Provider   naproxen (NAPROSYN) 375 MG tablet Take 1 tablet by mouth 2 times daily for 7 days 16  Delma Kaiser PA-C        PRENATAL CARE:    Complicated by: pylectasis @20wks, resolved    REVIEW OF SYSTEMS:     Pertinent items are noted in HPI.     PHYSICAL EXAM:    Vital Signs:   Vitals:    22 0030   BP: 114/86   Pulse: 107   Resp: 16   Temp:    SpO2:        /86   Pulse 107   Temp 98 °F (36.7 °C) (Axillary)   Resp 16   Ht 5' 1\" (1.549 m)   Wt 133 lb (60.3 kg)   SpO2 100%   BMI 25.13 kg/m²   General appearance: alert, appears stated age, cooperative and no distress  Head: Normocephalic, without obvious abnormality, atraumatic  Lungs: clear to auscultation bilaterally  Heart: regular rate and rhythm  Abdomen: term gravid, NT throughout  Neurologic: Grossly normal    Fetal heart rate:  Baseline Heart Rate 135, accelerations:  present  long term variability:  moderate  decelerations:  absent  Cervix:  Complete 100%  +1, BBOW, AROM with clear fluid    Contraction frequency:  3 minutes    Membranes:  Ruptured now, clear fluid    General Labs:      CBC:   Lab Results   Component Value Date    WBC 10.8 02/15/2022    RBC 4.36 02/15/2022    HGB 12.4 02/15/2022    HCT 37.3 02/15/2022    MCV 85.7 02/15/2022    MCH 28.5 02/15/2022    MCHC 33.2 02/15/2022    RDW 13.9 02/15/2022     02/15/2022    MPV 9.1 02/15/2022       ASSESSMENT/PLAN:    Patient Active Problem List   Diagnosis    Normal pregnancy, third trimester      22 y.o. female 41w4d. OB History        1    Para        Term                AB        Living           SAB        IAB        Ectopic        Molar        Multiple        Live Births                  Normal labor s/p AROM now, starting second stage. Reassuring fetal status. GBS neg.     Linda Pedraza MD  2022

## 2022-02-16 NOTE — PROGRESS NOTES
RN remained at bedside throughout pushing. EFM continuously assessed. Vaginal delivery of viable infant. KALEN Gambino RN, and this RN present for delivery.

## 2022-02-16 NOTE — ANESTHESIA PROCEDURE NOTES
Epidural Block    Patient location during procedure: OB  Reason for block: labor epidural  Staffing  Resident/CRNA: JANN Tao CRNA  Preanesthetic Checklist  Completed: patient identified, IV checked, risks and benefits discussed, monitors and equipment checked, pre-op evaluation, timeout performed, anesthesia consent given, oxygen available and patient being monitored  Epidural  Patient position: sitting  Prep: ChloraPrep and site prepped and draped  Patient monitoring: continuous pulse ox and frequent blood pressure checks  Approach: midline  Location: lumbar (1-5)  Injection technique: DIONY saline  Provider prep: sterile gloves and mask  Needle  Needle type: Tuohy   Needle gauge: 17 G  Needle length: 3.5 in  Needle insertion depth: 6 cm  Catheter type: side hole  Catheter size: 19 G  Catheter at skin depth: 12 cm  Test dose: negative  Assessment  Hemodynamics: stable  Attempts: 1  Additional Notes  Pt prepped and draped in sterile fashion. Skin wheal with 1% lidocaine. DIONY with 3 cc NS, 25g spinal needle inserted per nataliya. No CSF visualized in hub. Needle withdrawn and catheter threaded. Negative test dose 3cc 1.5% Lidocaine with Epinephrine. Sterile dressing applied.

## 2022-02-16 NOTE — L&D DELIVERY SUMMARY NOTE
Department of Obstetrics and Gynecology  Spontaneous Vaginal Delivery Note      Pre-operative Diagnosis:  Spontaneous labor @40+2, Thin meconium    Post-operative Diagnosis:  Living  infant(s) and Male, 3a laceration    Information for the patient's :  Peri Szymanski [[id number]]          Infant Wt:   Information for the patient's :  Peri Szymanski [[id number]]           APGARS:     Information for the patient's :  Diaz Sea [[id number]]           Anesthesia:  epidural anesthesia    Delivery:  Pt progressed to complete. Friable vaginal tissue, with diffuse mild vulvar swelling and steady posterior perineal bleeding during the final 30min of pushing. Thin mec noted during pushing. Pushed for over 2 hours to deliver a vigorous male MOISES. Shoulders followed easily. Infant placed on pt abd, cord clamped/cut when pulsation stopped. Placenta delivered 3VI with gentle traction. Inspection revealed 3a laceration through 20% of EAS. IAS intact. EAS repaired with 4 interrupted 2.0 vicryl. Overlying 5cm L-sided 2nd degree post perineal lac repaired in running fashion with 3.0 vicryl. R posterior 1st deg perineal lac hemostatic, no repair. Bilat posterior labial lacerations repaired w/4.0 vicryl in running fashion. Figure of 8 placed on anterior R labial lac. Hemostasis noted throughout. RVE nl with intact anal mucosa and nl tone. Intermittent uterine atony with removal of small clots. Pitocin opened wide and methergine 0.2mg IM x 1 given. Fundus firmed up.      EBL: 500ml    Delivery Summary:  Labor & Delivery Summary  Labor Onset Date: 02/15/22  Labor Onset Time: 1  Dilation Complete Date: 02/15/22  Dilation Complete Time: 6       Intake/Output:     Date 02/15/22 07 - 22 0700 22 07 - 22 07   Shift 7804-0018 5977-3232 24 Hour Total 1826-7415 2212-5194 24 Hour Total   INTAKE   I.V.  2603.4 2603.4      Shift Total  2603.4 2603.4      OUTPUT   Urine  900 900      Blood  -422 -422        Quantitative Blood Loss (mL)  -422 -422      Shift Total  478 478      NET  2125.4 2125.4          Condition:  mother and infant stable    Blood Type and Rh: O POS    Rubella Immunity Status:   Immune         Attending Attestation: I performed the procedure.

## 2022-02-16 NOTE — PLAN OF CARE
Problem: Pain:  Goal: Pain level will decrease  Description: Pain level will decrease  2/16/2022 0441 by Belinda Rea RN  Outcome: Completed  Goal: Control of acute pain  Description: Control of acute pain  2/16/2022 0441 by Belinda Rea RN  Outcome: Completed  Goal: Control of chronic pain  Description: Control of chronic pain  2/16/2022 0441 by Belinda Rea RN  Outcome: Completed     Problem: Anxiety:  Goal: Level of anxiety will decrease  Description: Level of anxiety will decrease  2/16/2022 0441 by Belinda Rea RN  Outcome: Completed     Problem: Breathing Pattern - Ineffective:  Goal: Able to breathe comfortably  Description: Able to breathe comfortably  2/16/2022 0441 by Belinda Rea RN  Outcome: Completed     Problem: Fluid Volume - Imbalance:  Goal: Absence of imbalanced fluid volume signs and symptoms  Description: Absence of imbalanced fluid volume signs and symptoms  2/16/2022 0441 by Belinda Rea RN  Outcome: Completed  Goal: Absence of intrapartum hemorrhage signs and symptoms  Description: Absence of intrapartum hemorrhage signs and symptoms  2/16/2022 0441 by Belinda Rea RN  Outcome: Completed

## 2022-02-16 NOTE — LACTATION NOTE
This note was copied from a baby's chart. Lactation Progress Note      Data:   Primip breast feeder who delivered early this am. Mob states that baby has had a few good feeds. Action: Breast feeding education initiated. Encouraged to allow baby to go to breast ad babs and stressed importance of always achieving a good deep latch. Encouraged to call [facility] for latch assessment with next feed. Discouraged paci, bottles and pumping for the first few weeks. Encouraged good hydration and nutrition. [facility] number on board for f/u. Response: Verbalized understanding and will call for latch check.

## 2022-02-16 NOTE — ANESTHESIA PRE PROCEDURE
Department of Anesthesiology  Preprocedure Note       Name:  Fredis Joy   Age:  22 y.o.  :  2000                                          MRN:  [id number]         Date:  2/15/2022      Surgeon: * No surgeons listed *    Procedure: * No procedures listed *    Medications prior to admission:   Prior to Admission medications    Medication Sig Start Date End Date Taking?  Authorizing Provider   naproxen (NAPROSYN) 375 MG tablet Take 1 tablet by mouth 2 times daily for 7 days 16  Delma Kaiser PA-C       Current medications:    Current Facility-Administered Medications   Medication Dose Route Frequency Provider Last Rate Last Admin    0.9 % sodium chloride infusion  25 mL IntraVENous PRN Annette Palencia, DO        butorphanol (STADOL) injection 1 mg  1 mg IntraVENous Q3H PRN Annette Palencia, DO        docusate sodium (COLACE) capsule 100 mg  100 mg Oral BID Annette Palencia, DO        famotidine (PEPCID) injection 20 mg  20 mg IntraVENous BID Annette Palencia, DO        lactated ringers bolus  500 mL IntraVENous PRN Annette Palencia, DO        Or    lactated ringers bolus  1,000 mL IntraVENous PRN Annette Palencia, DO        lactated ringers infusion   IntraVENous Continuous Annette Palencia,  mL/hr at 02/15/22 2023 New Bag at 02/15/22 2023    miSOPROStol (CYTOTEC) pre-split tablet TABS 25 mcg  25 mcg Vaginal Q4H Annette Palencia, DO        nalbuphine (NUBAIN) injection 10 mg  10 mg IntraVENous Q2H PRN Annette Palencia, DO        ondansetron [facility]) injection 4 mg  4 mg IntraVENous Q6H PRN Annette Palencia, DO        oxytocin (PITOCIN) 30 units in 500 mL infusion  87.3 geovanny-units/min IntraVENous Continuous PRN Annette Palencia, DO        And    oxytocin (PITOCIN) 10 unit bolus from the bag  10 Units IntraVENous PRN Annette Palencia, DO        oxytocin (PITOCIN) 30 units in 500 mL infusion  1-20 geovanny-units/min IntraVENous Continuous Dominics Talha, DO        sodium chloride flush 0.9 % injection 5-40 mL  5-40 mL IntraVENous 2 times per day Sandi Care, DO        sodium chloride flush 0.9 % injection 5-40 mL  5-40 mL IntraVENous PRN Sandi Care, DO           Allergies:  No Known Allergies    Problem List:    Patient Active Problem List   Diagnosis Code    Normal pregnancy, third trimester Z34.93       Past Medical History:        Diagnosis Date    HSV (herpes simplex virus) anogenital infection     pt stated \"canker sore\" no treatment       Past Surgical History:        Procedure Laterality Date    TONSILLECTOMY      TYMPANOSTOMY TUBE PLACEMENT         Social History:    Social History     Tobacco Use    Smoking status: Passive Smoke Exposure - Never Smoker    Smokeless tobacco: Never Used   Substance Use Topics    Alcohol use: No                                Counseling given: Not Answered      Vital Signs (Current):   Vitals:    02/15/22 1503 02/15/22 1506 02/15/22 1812 02/15/22 1930   BP: 122/83  123/73 119/76   Pulse: 99  73 72   Resp: 16  16 16   Temp: 36.7 °C (98.1 °F)  36.5 °C (97.7 °F) 36.7 °C (98.1 °F)   TempSrc: Oral  Oral Axillary   Weight:  133 lb (60.3 kg)     Height:  5' 1\" (1.549 m)                                                BP Readings from Last 3 Encounters:   02/15/22 119/76   10/22/21 107/71   02/27/17 107/66       NPO Status:                                                                                 BMI:   Wt Readings from Last 3 Encounters:   02/15/22 133 lb (60.3 kg)   10/22/21 122 lb (55.3 kg)   02/27/17 116 lb 9.6 oz (52.9 kg) (49 %, Z= -0.02)*     * Growth percentiles are based on CDC (Girls, 2-20 Years) data. Body mass index is 25.13 kg/m².     CBC:   Lab Results   Component Value Date    WBC 10.8 02/15/2022    RBC 4.36 02/15/2022    HGB 12.4 02/15/2022    HCT 37.3 02/15/2022    MCV 85.7 02/15/2022    RDW 13.9 02/15/2022     02/15/2022       CMP:   Lab Results   Component Value Date     10/22/2021    K 3.4 10/22/2021     10/22/2021    CO2 22 10/22/2021    BUN 8 10/22/2021    CREATININE 0.7 10/22/2021    GFRAA >60 10/22/2021    AGRATIO 1.1 10/22/2021    LABGLOM >60 10/22/2021    GLUCOSE 115 10/22/2021    PROT 6.7 10/22/2021    CALCIUM 8.9 10/22/2021    BILITOT 0.5 10/22/2021    ALKPHOS 59 10/22/2021    AST 21 10/22/2021    ALT 17 10/22/2021       POC Tests: No results for input(s): POCGLU, POCNA, POCK, POCCL, POCBUN, POCHEMO, POCHCT in the last 72 hours. Coags: No results found for: PROTIME, INR, APTT    HCG (If Applicable): No results found for: PREGTESTUR, PREGSERUM, HCG, HCGQUANT     ABGs: No results found for: PHART, PO2ART, PPN8XHA, UEQ4IJM, BEART, Y9ROWTRE     Type & Screen (If Applicable):  No results found for: LABABO, LABRH    Drug/Infectious Status (If Applicable):  No results found for: HIV, HEPCAB    COVID-19 Screening (If Applicable):   Lab Results   Component Value Date    COVID19 Not Detected 02/14/2022           Anesthesia Evaluation  Patient summary reviewed and Nursing notes reviewed no history of anesthetic complications:   Airway: Mallampati: II     Neck ROM: full   Dental: normal exam         Pulmonary:Negative Pulmonary ROS and normal exam                               Cardiovascular:Negative CV ROS                      Neuro/Psych:   Negative Neuro/Psych ROS              GI/Hepatic/Renal: Neg GI/Hepatic/Renal ROS            Endo/Other: Negative Endo/Other ROS                    Abdominal:             Vascular: Other Findings:             Anesthesia Plan      epidural     ASA 2 - emergent     (I discussed with the patient the risks and benefits of labor epidural placement. All questions were answered the patient agrees with the plan.    Recent Vitals:  ---------------------------               02/15/22                      1930         ---------------------------   BP:          119/76         Pulse:         72           Resp:          16           Temp: 36.7 °C (98.1 °F)  ---------------------------  Body mass index is 25.13 kg/m². Social History    Tobacco Use      Smoking status: Passive Smoke Exposure - Never Smoker      Smokeless tobacco: Never Used      LABS:    CBC  Lab Results       Component                Value               Date/Time                  WBC                      10.8                02/15/2022 03:35 PM        HGB                      12.4                02/15/2022 03:35 PM        HCT                      37.3                02/15/2022 03:35 PM        PLT                      235                 02/15/2022 03:35 PM   RENAL  Lab Results       Component                Value               Date/Time                  NA                       134 (L)             10/22/2021 07:10 PM        K                        3.4 (L)             10/22/2021 07:10 PM        CL                       100                 10/22/2021 07:10 PM        CO2                      22                  10/22/2021 07:10 PM        BUN                      8                   10/22/2021 07:10 PM        CREATININE               0.7                 10/22/2021 07:10 PM        GLUCOSE                  115 (H)             10/22/2021 07:10 PM   COAGS  No results found for: PROTIME, INR, APTT)        Anesthetic plan and risks discussed with patient.                       JANN Corley - CRNA   2/15/2022

## 2022-02-16 NOTE — PROGRESS NOTES
Dr. Es Hammer updated that SVE at this time is 3/90/-2. Provider does not wish to augment labor at this time; continue to monitor spontaneous progress.

## 2022-02-16 NOTE — PROGRESS NOTES
MADELYN Sherman at bedside to place epidural. This RN present during procedure providing care as needed to patient.     2023-IV bolus started    2041-CRNA notified    2048-CRNA in room    2054-Catheter in    2055-Negative test dose

## 2022-02-17 PROCEDURE — 1220000000 HC SEMI PRIVATE OB R&B

## 2022-02-17 PROCEDURE — 6370000000 HC RX 637 (ALT 250 FOR IP): Performed by: OBSTETRICS & GYNECOLOGY

## 2022-02-17 RX ADMIN — MOXIFLOXACIN HYDROCHLORIDE 800 MG: 400 TABLET, FILM COATED ORAL at 09:46

## 2022-02-17 RX ADMIN — ACETAMINOPHEN 650 MG: 325 TABLET ORAL at 13:36

## 2022-02-17 RX ADMIN — DOCUSATE SODIUM 100 MG: 100 CAPSULE, LIQUID FILLED ORAL at 09:47

## 2022-02-17 RX ADMIN — DOCUSATE SODIUM 100 MG: 100 CAPSULE, LIQUID FILLED ORAL at 20:42

## 2022-02-17 RX ADMIN — MOXIFLOXACIN HYDROCHLORIDE 800 MG: 400 TABLET, FILM COATED ORAL at 17:03

## 2022-02-17 ASSESSMENT — PAIN SCALES - GENERAL
PAINLEVEL_OUTOF10: 3
PAINLEVEL_OUTOF10: 4
PAINLEVEL_OUTOF10: 2

## 2022-02-17 NOTE — PLAN OF CARE
Problem: Discharge Planning:  Goal: Discharged to appropriate level of care  Description: Discharged to appropriate level of care  2/17/2022 1215 by Daisy Aragon RN  Outcome: Ongoing  2/16/2022 2303 by Jonathan Espino RN  Outcome: Ongoing     Problem: Constipation:  Goal: Bowel elimination is within specified parameters  Description: Bowel elimination is within specified parameters  2/17/2022 1215 by Daisy Aragon RN  Outcome: Ongoing  2/16/2022 2303 by Jonathan Espino RN  Outcome: Ongoing     Problem: Fluid Volume - Imbalance:  Goal: Absence of imbalanced fluid volume signs and symptoms  Description: Absence of imbalanced fluid volume signs and symptoms  2/17/2022 1215 by Daisy Aragon RN  Outcome: Ongoing  2/16/2022 2303 by Jonathan Espino RN  Outcome: Ongoing  Goal: Absence of postpartum hemorrhage signs and symptoms  Description: Absence of postpartum hemorrhage signs and symptoms  2/17/2022 1215 by Daisy Aragon RN  Outcome: Ongoing  2/16/2022 2303 by Jonathan Espino RN  Outcome: Ongoing     Problem: Infection - Risk of, Puerperal Infection:  Goal: Will show no infection signs and symptoms  Description: Will show no infection signs and symptoms  2/17/2022 1215 by Daisy Aragon RN  Outcome: Ongoing  2/16/2022 2303 by Jonathan Espino RN  Outcome: Ongoing     Problem: Mood - Altered:  Goal: Mood stable  Description: Mood stable  2/17/2022 1215 by Daisy Aragon RN  Outcome: Ongoing  2/16/2022 2303 by Jonathan Espino RN  Outcome: Ongoing     Problem: Pain - Acute:  Goal: Pain level will decrease  Description: Pain level will decrease  2/17/2022 1215 by Daisy Aragon RN  Outcome: Ongoing  2/16/2022 2303 by Jonathan Espino RN  Outcome: Ongoing

## 2022-02-17 NOTE — FLOWSHEET NOTE
Dermoplast spray and Luther Starla [id number] provided for perineal/rectal discomfort. Encouraged ice pack to swollen area for 24 hours after delivery. Verbal understanding stated.

## 2022-02-17 NOTE — PROGRESS NOTES
OBGYN Resident Progress Note  PPD#1 s/p     S: No complaints, samuel po, pain controlled by meds, + ambulation, denies vaginal bleeding. Urinating well. Eating well     O: /89   Pulse 78   Temp 97.7 °F (36.5 °C) (Oral)   Resp 18   Ht 5' 5\" (1.651 m)   Wt 189 lb (85.7 kg)   LMP 2019   Breastfeeding Unknown   BMI 31.45 kg/m²     Abd - soft, fundus firm below umbilicus  Ext warm well perfused    WBC/Hgb/Hct/Plts:  18.0/11.0/33.2/239 (656)    A/P: PPD#1 s/p    1. Recovering well  2. Meeting postpartum milestones. 3. Cont routine pp care while in hosp  4. Pelvic rest x 6wk  5.  Rx for Ibuprofen provided prn pain    Fifi Holcomb DO   [place] PGY-2  11:42 AM  22

## 2022-02-17 NOTE — PROGRESS NOTES
OBGYN Attending Progress Note  PPD#1 s/p       S: No complaints, samuel po, pain controlled by meds, + ambulation, lochia decresing similar to menses, voiding without difficulty    O: /65   Pulse 70   Temp 97.9 °F (36.6 °C) (Oral)   Resp 17   Ht 5' 1\" (1.549 m)   Wt 133 lb (60.3 kg)   SpO2 100%   Breastfeeding Unknown   BMI 25.13 kg/m²   Abd - soft, fundus firm below umbilicus  Ext warm well perfused    WBC/Hgb/Hct/Plts:  17.7/10.5/31.4/199 ( 1143)    A/P: PPD #1   1. Recovering well  2. Meeting postpartum milestones  3. Cont routine pp care  4. Male infant-desires circ plan prior to discharge  5.  Anticipate discharge home tomorrow      Manual DO YONG Rowley

## 2022-02-18 VITALS
WEIGHT: 133 LBS | DIASTOLIC BLOOD PRESSURE: 82 MMHG | BODY MASS INDEX: 25.11 KG/M2 | RESPIRATION RATE: 18 BRPM | TEMPERATURE: 97.9 F | HEART RATE: 99 BPM | OXYGEN SATURATION: 100 % | HEIGHT: 61 IN | SYSTOLIC BLOOD PRESSURE: 127 MMHG

## 2022-02-18 PROCEDURE — 6370000000 HC RX 637 (ALT 250 FOR IP): Performed by: OBSTETRICS & GYNECOLOGY

## 2022-02-18 RX ORDER — IBUPROFEN 800 MG/1
800 TABLET ORAL EVERY 8 HOURS PRN
Qty: 15 TABLET | Refills: 0 | Status: SHIPPED | OUTPATIENT
Start: 2022-02-18

## 2022-02-18 RX ADMIN — DOCUSATE SODIUM 100 MG: 100 CAPSULE, LIQUID FILLED ORAL at 08:50

## 2022-02-18 RX ADMIN — MOXIFLOXACIN HYDROCHLORIDE 800 MG: 400 TABLET, FILM COATED ORAL at 08:50

## 2022-02-18 RX ADMIN — MOXIFLOXACIN HYDROCHLORIDE 800 MG: 400 TABLET, FILM COATED ORAL at 03:01

## 2022-02-18 ASSESSMENT — PAIN SCALES - GENERAL
PAINLEVEL_OUTOF10: 2
PAINLEVEL_OUTOF10: 2

## 2022-02-18 NOTE — PROGRESS NOTES
Discharge Phone Call    Patient Name: Marylene Guile     [facility] Care Provider: Shakila Gardiner MD Discharge Date: 2022    Disposition of baby:    Phone Number: [phone] (home)     Attempts to Contact:  Date:    Caller  Date:    Caller  Date:    Caller    Information for the patient's :  Alessandro Schmidt [[id number]]   Delivery Method: Vaginal, Spontaneous       1. Now that you are at home is your pain being well controlled? Y/N   If no, instruct to call       provider. 2. Are you breastfeeding? Y/N    Do you need any extra support from our lactation staff? Y/N    If yes, provide number for lactation. 3. Have you made or already had your first appointment with the baby's doctor? Y/N   If no, do      you know when to schedule it? Y/N    4. Have you scheduled your follow-up appointment? Y/N  If no, do you know when to schedule       it? Y/N   If no, they can find it on printed discharge instructions. 5. Did staff discuss safe sleep during your stay? Y/N   6. Did we explain things in a way you could understand? Y/N  7. Were we respectful of your preferences for labor and birth and include you in the plan of       care? Y/N  If no, please explain _______________________________________________  8. Is there anyone in particular you would like to mention who provided care for you? _______      _________________________________________________________________________     9. Were you given a Post-Birth Warning Signs handout? Y/N  Do you have it somewhere      easily accessible? Y/N  If no, please send them a copy and ask them to put it somewhere      easily found. 10. Have you been crying excessively, having anger or mood swings that feel out of control, or       feel like you can't cope with caring for yourself or baby? Y/N   If yes, they may be showing       signs of postpartum depression and should call provider.  There is also a        depression test on page C5 in their discharge booklet they can take. 13. Do you have any other questions or concerns I can address today?  Y/N  ______________      _________________________________________________________________________    Information provided during call :_________________________________________________  ___________________________________________________________________________    Call completed by:____________________________    Date:_________ Time:___________

## 2022-02-18 NOTE — ANESTHESIA POSTPROCEDURE EVALUATION
Department of Anesthesiology  Postprocedure Note    Patient: Raeann Dear  MRN: [id number]  YOB: 2000  Date of evaluation: 2/18/2022  Time:  11:11 AM     Procedure Summary     Date: 02/15/22 Room / Location:     Anesthesia Start: 2048 Anesthesia Stop: 02/16/22 0257    Procedure: Labor Analgesia Diagnosis:     Scheduled Providers:  Responsible Provider: Sean Granado MD    Anesthesia Type: epidural ASA Status: 2 - Emergent          Anesthesia Type: epidural    Javan Phase I: Javan Score: 9    Javan Phase II: Javan Score: 9    Last vitals: Reviewed and per EMR flowsheets. Anesthesia Post Evaluation    Level of consciousness: awake and alert  Nausea & Vomiting: no nausea and no vomiting  Complications: no  Cardiovascular status: hemodynamically stable  Respiratory status: acceptable and room air  Hydration status: stable  Comments: Patient is s/p vaginal delivery with epidural analgesia. Progress notes, flow sheets, labs, and MARs reviewed. No apparent or reported anesthesia complications thus far.

## 2022-02-18 NOTE — DISCHARGE SUMMARY
Obstetric Discharge Summary    Admitting Diagnosis  IUP 40 weeks  OB History        1    Para   1    Term   1            AB        Living   1       SAB        IAB        Ectopic        Molar        Multiple   0    Live Births   1                Reasons for Admission on 2/15/2022  2:50 PM  Normal pregnancy, third trimester [Z34.93]  No comment available  Onset of Labor    Prenatal Procedures  None    Intrapartum Procedures                 Spontaneous Vaginal Delivery: See Labor and Delivery Summary       Postpartum Procedures  None    Postpartum/Operative Complications        Data  Information for the patient's :  Joel Suárez [[id number]]   male   Birth Weight: 6 lb 14.6 oz (3.135 kg)     Discharge With Mother  Complications: No    Discharge Diagnosis:   Discharge condition: stable       Discharge Information  Current Discharge Medication List      START taking these medications    Details   ibuprofen (ADVIL;MOTRIN) 800 MG tablet Take 1 tablet by mouth every 8 hours as needed for Pain  Qty: 15 tablet, Refills: 0         STOP taking these medications       naproxen (NAPROSYN) 375 MG tablet Comments:   Reason for Stopping:               No discharge procedures on file. Discharge to: Home  Follow up in 4 weeks at [facility] w/ Dr. Mala Pathak. Discharge Date: 22 Time: 10:34 AM    Comments  S:Ambulating, tolerating regular diet, decreased lochia, passing flatus, urinating without complaints, pain controlled with oral meds.    O:  Vitals:    22 0945 22 1702 22 2040 22 0259   BP: 104/60 116/67 109/70 111/72   Pulse: 67 85 71 66   Resp: 16 16 16 16   Temp: 98.1 °F (36.7 °C) 98.4 °F (36.9 °C) 98.3 °F (36.8 °C) 98.1 °F (36.7 °C)   TempSrc: Oral Oral Oral Oral   SpO2:       Weight:       Height:         Abd:BS present, NT,ND  Fundus:Firm 2-3 cm below umbilicus  Recent Labs     22  1143   WBC 17.7*   RBC 3.67*   HGB 10.5*   HCT 31.4*   MCV 85.6   RDW

## 2022-11-03 ENCOUNTER — HOSPITAL ENCOUNTER (EMERGENCY)
Age: 22
Discharge: HOME | End: 2022-11-03
Payer: COMMERCIAL

## 2022-11-03 VITALS
OXYGEN SATURATION: 96 % | RESPIRATION RATE: 16 BRPM | HEART RATE: 80 BPM | BODY MASS INDEX: 21.52 KG/M2 | SYSTOLIC BLOOD PRESSURE: 107 MMHG | HEIGHT: 61 IN | TEMPERATURE: 98.1 F | DIASTOLIC BLOOD PRESSURE: 63 MMHG | WEIGHT: 114 LBS

## 2022-11-03 DIAGNOSIS — S91.052A DOG BITE OF LEFT ANKLE, INITIAL ENCOUNTER: Primary | ICD-10-CM

## 2022-11-03 DIAGNOSIS — L03.116 LEFT LEG CELLULITIS: ICD-10-CM
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
## 2022-11-03 PROCEDURE — 6370000000 HC RX 637 (ALT 250 FOR IP): Performed by: PHYSICIAN ASSISTANT

## 2022-11-03 PROCEDURE — 6360000002 HC RX W HCPCS: Performed by: PHYSICIAN ASSISTANT

## 2022-11-03 PROCEDURE — 90715 TDAP VACCINE 7 YRS/> IM: CPT | Performed by: PHYSICIAN ASSISTANT

## 2022-11-03 PROCEDURE — 90471 IMMUNIZATION ADMIN: CPT | Performed by: PHYSICIAN ASSISTANT

## 2022-11-03 PROCEDURE — 99284 EMERGENCY DEPT VISIT MOD MDM: CPT

## 2022-11-03 RX ORDER — AMOXICILLIN AND CLAVULANATE POTASSIUM 875; 125 MG/1; MG/1
1 TABLET, FILM COATED ORAL ONCE
Status: COMPLETED | OUTPATIENT
Start: 2022-11-03 | End: 2022-11-03

## 2022-11-03 RX ORDER — AMOXICILLIN AND CLAVULANATE POTASSIUM 875; 125 MG/1; MG/1
1 TABLET, FILM COATED ORAL 2 TIMES DAILY
Qty: 14 TABLET | Refills: 0 | Status: SHIPPED | OUTPATIENT
Start: 2022-11-03 | End: 2022-11-10

## 2022-11-03 RX ADMIN — AMOXICILLIN AND CLAVULANATE POTASSIUM 1 TABLET: 875; 125 TABLET, FILM COATED ORAL at 17:40

## 2022-11-03 RX ADMIN — TETANUS TOXOID, REDUCED DIPHTHERIA TOXOID AND ACELLULAR PERTUSSIS VACCINE, ADSORBED 0.5 ML: 5; 2.5; 8; 8; 2.5 SUSPENSION INTRAMUSCULAR at 17:40

## 2022-11-03 SDOH — ECONOMIC STABILITY: FOOD INSECURITY: WITHIN THE PAST 12 MONTHS, THE FOOD YOU BOUGHT JUST DIDN'T LAST AND YOU DIDN'T HAVE MONEY TO GET MORE.: NEVER TRUE

## 2022-11-03 ASSESSMENT — PAIN - FUNCTIONAL ASSESSMENT: PAIN_FUNCTIONAL_ASSESSMENT: NONE - DENIES PAIN

## 2022-11-03 ASSESSMENT — ENCOUNTER SYMPTOMS
GASTROINTESTINAL NEGATIVE: 1
RESPIRATORY NEGATIVE: 1
COLOR CHANGE: 1

## 2022-11-03 NOTE — ED NOTES
Pt scripts x1 instructed to follow up with PCP. Assessed per Kisha MCCAULEY.      Mateo Borja, DALI  58/84/14 [id number]

## 2022-11-03 NOTE — ED NOTES
Pt left leg dog bite cleansed with Hibiclens/Normal Saline/ red area marked with Surgical Marker for pt to continue monitoring for redness/applied with Gauze/kerlex/tape.  Pt      Samy Cabrera LPN  61/06/28 [id number]

## 2022-11-03 NOTE — ED PROVIDER NOTES
[facility]  ED  EMERGENCY DEPARTMENT ENCOUNTER        Pt Name: Kenya Conteh  MRN: [id number]  [place] 2000  Date of evaluation: 11/3/2022  Provider: Marvin Gloria PA-C  PCP: Baylee MENDEZ  Note Started: 5:58 PM EDT       MITA. I have evaluated this patient. My supervising physician was available for consultation. CHIEF COMPLAINT       Chief Complaint   Patient presents with    Animal Bite     Left lower leg bit on Saturday per pt wound looking worse       HISTORY OF PRESENT ILLNESS   (Location, Timing/Onset, Context/Setting, Quality, Duration, Modifying Factors, Severity, Associated Signs and Symptoms)  Note limiting factors. Chief Complaint: Dog bite left ankle    Kenya Conteh is a 25 y.o. female who presents complaining of a dog bite to the left ankle x5 days ago. Patient reports her and her boyfriend were play fighting/wrestling when his dog bit her on the left ankle 5 days ago. She reports and did not have a puncture wound, and thought was a scratch. She noticed redness and swelling yesterday, worsening today, came to the ER. Boyfriend's dog is up-to-date on vaccines as far as patient knows. Not up-to-date on tetanus. Denies fever, decreased range of motion, vomiting, taking any current antibiotics. Nursing Notes were all reviewed and agreed with or any disagreements were addressed in the HPI. REVIEW OF SYSTEMS    (2-9 systems for level 4, 10 or more for level 5)     Review of Systems   Constitutional: Negative. Respiratory: Negative. Cardiovascular: Negative. Gastrointestinal: Negative. Genitourinary: Negative. Musculoskeletal: Negative. Skin:  Positive for color change, rash and wound. Negative for pallor. Neurological: Negative. Hematological: Negative. Positives and Pertinent negatives as per HPI. Except as noted above in the ROS, all other systems were reviewed and negative.        PAST MEDICAL HISTORY     Past Medical History:   Diagnosis Date    HSV-2 (herpes simplex virus 2) infection     mouth sores only         SURGICAL HISTORY     Past Surgical History:   Procedure Laterality Date    TONSILLECTOMY      TYMPANOSTOMY TUBE PLACEMENT           [address]       Discharge Medication List as of 11/3/2022  5:44 PM        CONTINUE these medications which have NOT CHANGED    Details   ibuprofen (ADVIL;MOTRIN) 800 MG tablet Take 1 tablet by mouth every 8 hours as needed for Pain, Disp-15 tablet, R-0Normal               ALLERGIES     Patient has no known allergies. FAMILYHISTORY     History reviewed. No pertinent family history. SOCIAL HISTORY       Social History     Tobacco Use    Smoking status: Passive Smoke Exposure - Never Smoker    Smokeless tobacco: Never   Vaping Use    Vaping Use: Never used   Substance Use Topics    Alcohol use: No    Drug use: No       SCREENINGS    Valeria Coma Scale  Eye Opening: Spontaneous  Best Verbal Response: Oriented  Best Motor Response: Obeys commands  Valeria Coma Scale Score: 15        PHYSICAL EXAM    (up to 7 for level 4, 8 or more for level 5)     ED Triage Vitals   BP Temp Temp Source Heart Rate Resp SpO2 Height Weight   11/03/22 1718 11/03/22 1719 11/03/22 1719 11/03/22 1718 11/03/22 1718 11/03/22 1718 11/03/22 1713 11/03/22 1713   107/63 98.1 °F (36.7 °C) Oral 80 16 96 % 5' 1\" (1.549 m) 114 lb (51.7 kg)       Physical Exam  Vitals and nursing note reviewed. Constitutional:       General: She is not in acute distress. Appearance: Normal appearance. She is not ill-appearing or toxic-appearing. HENT:      Head: Normocephalic and atraumatic. Right Ear: External ear normal.      Left Ear: External ear normal.   Eyes:      Conjunctiva/sclera: Conjunctivae normal.   Cardiovascular:      Rate and Rhythm: Normal rate. Pulses: Normal pulses. Pulmonary:      Effort: Pulmonary effort is normal.   Musculoskeletal:         General: Normal range of motion. Cervical back: Normal range of motion. Comments: Achilles intact   Skin:     General: Skin is warm and dry. Comments: Abrasions to lateral and posterior left ankle. No wounds with deficits. Mild surrounding erythema of the left ankle, about 10 cm diameter. No abscess/fluctuance/red streaking. Neurological:      General: No focal deficit present. Mental Status: She is alert and oriented to person, place, and time. Sensory: No sensory deficit. Motor: No weakness. Psychiatric:         Mood and Affect: Mood normal.         Behavior: Behavior normal.       DIAGNOSTIC RESULTS   LABS:    Labs Reviewed - No data to display    When ordered only abnormal lab results are displayed. All other labs were within normal range or not returned as of this dictation. EKG: When ordered, EKG's are interpreted by the Emergency Department Physician in the absence of a cardiologist.  Please see their note for interpretation of EKG. RADIOLOGY:   Non-plain film images such as CT, Ultrasound and MRI are read by the radiologist. Plain radiographic images are visualized and preliminarily interpreted by the ED Provider with the below findings:        Interpretation per the Radiologist below, if available at the time of this note:    No orders to display     No results found.         PROCEDURES   Unless otherwise noted below, none     Procedures    CRITICAL CARE TIME       CONSULTS:  None      EMERGENCY DEPARTMENT COURSE and DIFFERENTIAL DIAGNOSIS/MDM:   Vitals:    Vitals:    11/03/22 1713 11/03/22 1718 11/03/22 1719   BP:  107/63    Pulse:  80    Resp:  16    Temp:   98.1 °F (36.7 °C)   TempSrc:   Oral   SpO2:  96%    Weight: 114 lb (51.7 kg)     Height: 5' 1\" (1.549 m)         Patient was given the following medications:  Medications   amoxicillin-clavulanate (AUGMENTIN) 875-125 MG per tablet 1 tablet (1 tablet Oral Given 11/3/22 1740)   tetanus-diphth-acell pertussis (BOOSTRIX) injection 0.5 mL (0.5 mLs IntraMUSCular Given 11/3/22 9200)         Is this patient to be included in the SEP-1 Core Measure due to severe sepsis or septic shock? No   Exclusion criteria - the patient is NOT to be included for SEP-1 Core Measure due to:  2+ SIRS criteria are not met    MDM -briefly, complaining of dog bite to left ankle x5 days ago. Tetanus was updated here. No wounds with large deficits, active bleeding or purulent discharge. Patient does have mild surrounding erythema. Will start on Augmentin for potential cellulitis. No fever, tachycardia, vomiting, toxic appearance, red streaking. Patient instructed to follow-up for wound recheck in 1 to 2 days, return if not improving in 24 to 48 hours, return for any new or worsening symptoms including worsening signs of infection. FINAL IMPRESSION      1. Dog bite of left ankle, initial encounter    2. Left leg cellulitis          DISPOSITION/PLAN   DISPOSITION Decision To Discharge 11/03/2022 05:43:01 PM      PATIENT REFERRED TO:  [facility]ennifer   [phone]    In 2 days  Follow-up for wound recheck. Return for any new or worsening symptoms including worsening signs of infection. DISCHARGE MEDICATIONS:  Discharge Medication List as of 11/3/2022  5:44 PM        START taking these medications    Details   amoxicillin-clavulanate (AUGMENTIN) 875-125 MG per tablet Take 1 tablet by mouth 2 times daily for 7 days, Disp-14 tablet, R-0Normal             DISCONTINUED MEDICATIONS:  Discharge Medication List as of 11/3/2022  5:44 PM                 (Please note that portions of this note were completed with a voice recognition program.  Efforts were made to edit the dictations but occasionally words are mis-transcribed. )    Primitivo Nagy PA-C (electronically signed)            Primitivo Nagy PA-C  11/03/22 rFan Lan

## 2023-07-24 ENCOUNTER — HOSPITAL ENCOUNTER (OUTPATIENT)
Dept: ULTRASOUND IMAGING | Age: 23
Discharge: HOME | End: 2023-07-24
Payer: COMMERCIAL

## 2023-07-24 DIAGNOSIS — N60.12 FIBROCYSTIC BREAST CHANGES, BILATERAL: ICD-10-CM

## 2023-07-24 DIAGNOSIS — N60.11 FIBROCYSTIC BREAST CHANGES, BILATERAL: ICD-10-CM

## 2023-07-24 PROCEDURE — 76642 ULTRASOUND BREAST LIMITED: CPT

## 2024-05-15 ENCOUNTER — HOSPITAL ENCOUNTER (OUTPATIENT)
Dept: ULTRASOUND IMAGING | Age: 24
Discharge: HOME | End: 2024-05-15

## 2024-05-15 DIAGNOSIS — D24.2 BREAST FIBROADENOMA, LEFT: ICD-10-CM

## 2024-05-15 PROCEDURE — 76642 ULTRASOUND BREAST LIMITED: CPT
